# Patient Record
Sex: MALE | Race: BLACK OR AFRICAN AMERICAN | NOT HISPANIC OR LATINO | Employment: UNEMPLOYED | URBAN - METROPOLITAN AREA
[De-identification: names, ages, dates, MRNs, and addresses within clinical notes are randomized per-mention and may not be internally consistent; named-entity substitution may affect disease eponyms.]

---

## 2022-01-29 ENCOUNTER — HOSPITAL ENCOUNTER (EMERGENCY)
Age: 32
Discharge: LEFT BEFORE DISCHARGE | End: 2022-01-29

## 2022-01-29 VITALS
TEMPERATURE: 98.4 F | RESPIRATION RATE: 18 BRPM | OXYGEN SATURATION: 100 % | SYSTOLIC BLOOD PRESSURE: 129 MMHG | DIASTOLIC BLOOD PRESSURE: 86 MMHG | HEIGHT: 75 IN | BODY MASS INDEX: 22.38 KG/M2 | WEIGHT: 180 LBS | HEART RATE: 84 BPM

## 2022-01-29 LAB
ALBUMIN SERPL-MCNC: 4 G/DL (ref 3.6–5.1)
ALBUMIN/GLOB SERPL: 1 {RATIO} (ref 1–2.4)
ALP SERPL-CCNC: 88 UNITS/L (ref 45–117)
ALT SERPL-CCNC: 16 UNITS/L
ANION GAP SERPL CALC-SCNC: 12 MMOL/L (ref 10–20)
APPEARANCE UR: CLEAR
AST SERPL-CCNC: 9 UNITS/L
BACTERIA #/AREA URNS HPF: ABNORMAL /HPF
BASOPHILS # BLD: 0.1 K/MCL (ref 0–0.3)
BASOPHILS NFR BLD: 1 %
BILIRUB SERPL-MCNC: 0.3 MG/DL (ref 0.2–1)
BILIRUB UR QL STRIP: NEGATIVE
BUN SERPL-MCNC: 8 MG/DL (ref 6–20)
BUN/CREAT SERPL: 9 (ref 7–25)
CALCIUM SERPL-MCNC: 8.7 MG/DL (ref 8.4–10.2)
CHLORIDE SERPL-SCNC: 102 MMOL/L (ref 98–107)
CO2 SERPL-SCNC: 31 MMOL/L (ref 21–32)
COLOR UR: YELLOW
CREAT SERPL-MCNC: 0.94 MG/DL (ref 0.67–1.17)
DEPRECATED RDW RBC: 39.4 FL (ref 39–50)
EOSINOPHIL # BLD: 0.2 K/MCL (ref 0–0.5)
EOSINOPHIL NFR BLD: 3 %
ERYTHROCYTE [DISTWIDTH] IN BLOOD: 11.5 % (ref 11–15)
FASTING DURATION TIME PATIENT: NORMAL H
GFR SERPLBLD BASED ON 1.73 SQ M-ARVRAT: >90 ML/MIN
GLOBULIN SER-MCNC: 3.9 G/DL (ref 2–4)
GLUCOSE SERPL-MCNC: 89 MG/DL (ref 70–99)
GLUCOSE UR STRIP-MCNC: NEGATIVE MG/DL
HCT VFR BLD CALC: 40.4 % (ref 39–51)
HGB BLD-MCNC: 13.2 G/DL (ref 13–17)
HGB UR QL STRIP: ABNORMAL
HYALINE CASTS #/AREA URNS LPF: ABNORMAL /LPF
IMM GRANULOCYTES # BLD AUTO: 0 K/MCL (ref 0–0.2)
IMM GRANULOCYTES # BLD: 0 %
KETONES UR STRIP-MCNC: NEGATIVE MG/DL
LEUKOCYTE ESTERASE UR QL STRIP: ABNORMAL
LIPASE SERPL-CCNC: 92 UNITS/L (ref 73–393)
LYMPHOCYTES # BLD: 2.5 K/MCL (ref 1–4.8)
LYMPHOCYTES NFR BLD: 31 %
MCH RBC QN AUTO: 30.3 PG (ref 26–34)
MCHC RBC AUTO-ENTMCNC: 32.7 G/DL (ref 32–36.5)
MCV RBC AUTO: 92.7 FL (ref 78–100)
MONOCYTES # BLD: 0.7 K/MCL (ref 0.3–0.9)
MONOCYTES NFR BLD: 8 %
NEUTROPHILS # BLD: 4.6 K/MCL (ref 1.8–7.7)
NEUTROPHILS NFR BLD: 57 %
NITRITE UR QL STRIP: NEGATIVE
NRBC BLD MANUAL-RTO: 0 /100 WBC
PH UR STRIP: 6.5 [PH] (ref 5–7)
PLATELET # BLD AUTO: 218 K/MCL (ref 140–450)
POTASSIUM SERPL-SCNC: 3.7 MMOL/L (ref 3.4–5.1)
PROT SERPL-MCNC: 7.9 G/DL (ref 6.4–8.2)
PROT UR STRIP-MCNC: 100 MG/DL
RBC # BLD: 4.36 MIL/MCL (ref 4.5–5.9)
RBC #/AREA URNS HPF: ABNORMAL /HPF
SODIUM SERPL-SCNC: 141 MMOL/L (ref 135–145)
SP GR UR STRIP: >=1.03 (ref 1–1.03)
SQUAMOUS #/AREA URNS HPF: ABNORMAL /HPF
UROBILINOGEN UR STRIP-MCNC: 4 MG/DL
WBC # BLD: 8 K/MCL (ref 4.2–11)
WBC #/AREA URNS HPF: >100 /HPF

## 2022-01-29 PROCEDURE — 80053 COMPREHEN METABOLIC PANEL: CPT | Performed by: EMERGENCY MEDICINE

## 2022-01-29 PROCEDURE — 87086 URINE CULTURE/COLONY COUNT: CPT | Performed by: EMERGENCY MEDICINE

## 2022-01-29 PROCEDURE — 85025 COMPLETE CBC W/AUTO DIFF WBC: CPT | Performed by: EMERGENCY MEDICINE

## 2022-01-29 PROCEDURE — 83690 ASSAY OF LIPASE: CPT | Performed by: EMERGENCY MEDICINE

## 2022-01-29 PROCEDURE — 81001 URINALYSIS AUTO W/SCOPE: CPT | Performed by: EMERGENCY MEDICINE

## 2022-01-29 PROCEDURE — 10003627 HB COUNTER ED NO SERVICE

## 2022-01-29 PROCEDURE — 36415 COLL VENOUS BLD VENIPUNCTURE: CPT

## 2022-01-30 ENCOUNTER — APPOINTMENT (OUTPATIENT)
Dept: GENERAL RADIOLOGY | Facility: HOSPITAL | Age: 32
End: 2022-01-30
Attending: EMERGENCY MEDICINE
Payer: MEDICAID

## 2022-01-30 ENCOUNTER — HOSPITAL ENCOUNTER (EMERGENCY)
Facility: HOSPITAL | Age: 32
Discharge: HOME | End: 2022-01-30
Attending: EMERGENCY MEDICINE
Payer: MEDICAID

## 2022-01-30 VITALS
OXYGEN SATURATION: 99 % | RESPIRATION RATE: 20 BRPM | BODY MASS INDEX: 19.89 KG/M2 | HEART RATE: 63 BPM | TEMPERATURE: 99 F | DIASTOLIC BLOOD PRESSURE: 71 MMHG | WEIGHT: 160 LBS | SYSTOLIC BLOOD PRESSURE: 118 MMHG | HEIGHT: 75 IN

## 2022-01-30 DIAGNOSIS — K59.00 CONSTIPATION, UNSPECIFIED CONSTIPATION TYPE: Primary | ICD-10-CM

## 2022-01-30 DIAGNOSIS — K08.89 PAIN, DENTAL: ICD-10-CM

## 2022-01-30 LAB — BACTERIA UR CULT: NORMAL

## 2022-01-30 PROCEDURE — 99283 EMERGENCY DEPT VISIT LOW MDM: CPT

## 2022-01-30 PROCEDURE — 74019 RADEX ABDOMEN 2 VIEWS: CPT | Performed by: EMERGENCY MEDICINE

## 2022-01-30 RX ORDER — AMOXICILLIN AND CLAVULANATE POTASSIUM 875; 125 MG/1; MG/1
1 TABLET, FILM COATED ORAL 2 TIMES DAILY
Qty: 10 TABLET | Refills: 0 | Status: SHIPPED | OUTPATIENT
Start: 2022-01-30 | End: 2022-02-04

## 2022-01-31 NOTE — ED INITIAL ASSESSMENT (HPI)
Pt states last BM x 2 weeks ago. abd pain. States feels like has force urination. Denies nausea, vomiting, fever.

## 2025-03-07 ENCOUNTER — APPOINTMENT (OUTPATIENT)
Dept: GENERAL RADIOLOGY | Facility: HOSPITAL | Age: 35
End: 2025-03-07
Payer: MEDICAID

## 2025-03-07 ENCOUNTER — HOSPITAL ENCOUNTER (EMERGENCY)
Facility: HOSPITAL | Age: 35
Discharge: HOME | End: 2025-03-07
Attending: EMERGENCY MEDICINE
Payer: MEDICAID

## 2025-03-07 ENCOUNTER — TELEPHONE (OUTPATIENT)
Dept: ORTHOPEDICS CLINIC | Facility: CLINIC | Age: 35
End: 2025-03-07

## 2025-03-07 VITALS
SYSTOLIC BLOOD PRESSURE: 159 MMHG | DIASTOLIC BLOOD PRESSURE: 71 MMHG | HEART RATE: 64 BPM | BODY MASS INDEX: 18.27 KG/M2 | HEIGHT: 76 IN | TEMPERATURE: 97 F | WEIGHT: 150 LBS | RESPIRATION RATE: 20 BRPM | OXYGEN SATURATION: 100 %

## 2025-03-07 DIAGNOSIS — S69.92XA LEFT WRIST INJURY, INITIAL ENCOUNTER: Primary | ICD-10-CM

## 2025-03-07 DIAGNOSIS — M25.532 LEFT WRIST PAIN: ICD-10-CM

## 2025-03-07 DIAGNOSIS — M25.522 LEFT ELBOW PAIN: Primary | ICD-10-CM

## 2025-03-07 PROCEDURE — 73110 X-RAY EXAM OF WRIST: CPT | Performed by: EMERGENCY MEDICINE

## 2025-03-07 PROCEDURE — 73080 X-RAY EXAM OF ELBOW: CPT | Performed by: EMERGENCY MEDICINE

## 2025-03-07 PROCEDURE — 99284 EMERGENCY DEPT VISIT MOD MDM: CPT

## 2025-03-07 PROCEDURE — 73030 X-RAY EXAM OF SHOULDER: CPT | Performed by: EMERGENCY MEDICINE

## 2025-03-07 PROCEDURE — 29125 APPL SHORT ARM SPLINT STATIC: CPT

## 2025-03-07 RX ORDER — HYDROCODONE BITARTRATE AND ACETAMINOPHEN 5; 325 MG/1; MG/1
1 TABLET ORAL ONCE
Status: COMPLETED | OUTPATIENT
Start: 2025-03-07 | End: 2025-03-07

## 2025-03-07 RX ORDER — IBUPROFEN 600 MG/1
600 TABLET, FILM COATED ORAL ONCE
Status: COMPLETED | OUTPATIENT
Start: 2025-03-07 | End: 2025-03-07

## 2025-03-07 RX ORDER — HYDROCODONE BITARTRATE AND ACETAMINOPHEN 5; 325 MG/1; MG/1
1 TABLET ORAL EVERY 6 HOURS PRN
Qty: 10 TABLET | Refills: 0 | Status: SHIPPED | OUTPATIENT
Start: 2025-03-07 | End: 2025-03-12

## 2025-03-07 NOTE — TELEPHONE ENCOUNTER
Can you see this patient?  If so, when?  Please advise.  Thank you1      DOI    3/7/25      XR  3/7/25    Impression   CONCLUSION:     Dorsal subluxation of the wrist at the level of the distal radioulnar joint is nonspecific but can seen with distal radioulnar joint instability.

## 2025-03-07 NOTE — ED INITIAL ASSESSMENT (HPI)
Pt reports he fell yesterday and landed on his left side, Pt reports left shoulder, left elbow, and left wrist pain. Pt reports no loc and reports he did not hit his head. Cms present to his left hand.

## 2025-03-07 NOTE — TELEPHONE ENCOUNTER
Patient was in Emergency room today. In need of an appointment for Left wrist injury/dislocation. Xrays in Epic.  Please contact mom at [phone]

## 2025-03-07 NOTE — ED PROVIDER NOTES
Patient Seen in: [facility] Emergency Department    History     Chief Complaint   Patient presents with    Fall    Arm or Hand Injury       HPI    34-year-old male who had a mechanical fall yesterday falling onto his left wrist, did not strike his head.  He does report left wrist pain but also mild amount of left shoulder pain.  No weakness or numbness or paresthesias.    History reviewed.   Past Medical History:    Seizure disorder (HCC)       History reviewed. History reviewed. No pertinent surgical history.      Medications :  Prescriptions Prior to Admission[1]     No family history on file.    Smoking Status:   Social History     Socioeconomic History    Marital status: Single   Tobacco Use    Smoking status: Never    Smokeless tobacco: Never   Vaping Use    Vaping status: Never Used   Substance and Sexual Activity    Alcohol use: Never    Drug use: Never       Constitutional and vital signs reviewed.      Social History and Family History elements reviewed from today, pertinent positives to the presenting problem noted.    Physical Exam     ED Triage Vitals [03/07/25 1018]   /71   Pulse 64   Resp 20   Temp 97.1 °F (36.2 °C)   Temp src Temporal   SpO2 100 %   O2 Device None (Room air)       All measures to prevent infection transmission during my interaction with the patient were taken. The patient was already wearing a droplet mask on my arrival to the room. Personal protective equipment was worn throughout the duration of the exam.  Handwashing was performed prior to and after the exam.  Stethoscope and any equipment used during my examination was cleaned with super sani-cloth germicidal wipes following the exam.     Physical Exam    General: NAD  Head: Normocephalic and atraumatic.  Mouth/Throat/Ears/Nose: No hoarseness of voice  Eyes: Conjunctivae and EOM are normal.  Neck: Normal range of motion. Supple.   Cardiovascular: Normal rate  Respiratory/Chest: No tachypnea.  Gastrointestinal:  Nondistended  Musculoskeletal:No  deformity.  Trace swelling at the left dorsal radial tenderness, 2+ radial pulse . SILT.  A-OK and thumbs up signs.  There is mild tenderness and ecchymosis at the left shoulder.  5 out of 5 left hand  strength, shoulder abduction strength.  Neurological: Alert and appropriate.   Skin: Skin is warm and dry. No pallor.  Psychiatric: Has a normal mood and affect.      ED Course      Labs Reviewed - No data to display    As Interpreted by me    Imaging Results Available and Reviewed while in ED: XR WRIST COMPLETE (MIN 3 VIEWS), LEFT (CPT=73110)    Result Date: 3/7/2025  CONCLUSION:   Dorsal subluxation of the wrist at the level of the distal radioulnar joint is nonspecific but can seen with distal radioulnar joint instability.  No acute fracture.     Dictated by (CST): Jovani Del Angel MD on 3/07/2025 at 11:31 AM     Finalized by (CST): Jovani Del Angel MD on 3/07/2025 at 11:32 AM          XR ELBOW, COMPLETE (MIN 3 VIEWS), LEFT (CPT=73080)    Result Date: 3/7/2025  CONCLUSION:   Chronic appearing radial head dislocation with a dysplastic appearance of the radial head and neck.  No acute fracture.  Correlation with prior imaging recommended to demonstrate stability     Dictated by (CST): Jovain Del Angel MD on 3/07/2025 at 11:29 AM     Finalized by (CST): Jovani Del Angel MD on 3/07/2025 at 11:31 AM          XR SHOULDER, COMPLETE (MIN 2 VIEWS), LEFT (CPT=73030)    Result Date: 3/7/2025  CONCLUSION: Normal examination.     Dictated by (CST): Jovani Del Angel MD on 3/07/2025 at 11:28 AM     Finalized by (CST): Jovani Del Angel MD on 3/07/2025 at 11:29 AM         ED Medications Administered:   Medications   HYDROcodone-acetaminophen (Norco) 5-325 MG per tab 1 tablet (1 tablet Oral Given 3/7/25 1154)   ibuprofen (Motrin) tab 600 mg (600 mg Oral Given 3/7/25 1154)         MDM     Vitals:    03/07/25 1018   BP: 159/71   Pulse: 64   Resp: 20   Temp: 97.1 °F (36.2 °C)   TempSrc: Temporal   SpO2: 100%   Weight: 68  kg   Height: 193 cm (6' 4\")     *I personally reviewed and interpreted all ED vitals.    Pulse Ox: 100%, Room air, Normal     Medical Decision Making      Differential Diagnosis/ Diagnostic Considerations: Scaphoid fracture, contusion    Complicating Factors: The patient already has seizure disorder to contribute to the complexity of this ED evaluation.    I reviewed prior chart records including this note from February 27, 2025.  X-ray of the wrist without obvious fracture on my interpretation, agree with subtle possible subluxation noted on report, splint applied, understands to follow-up closely with orthopedics.  Referral provided.    Dc In stable condition.  Patient, mother and aunt are comfortable with the plan.    Prescriptions: As below    Procedure: Splint application  Indication: possible radio ulnar joint instability   Type: sugar tong   Performed by: ED tech   Complications: None   Post-splint exam by myself: Neurovascularly intact         Disposition and Plan     Clinical Impression:  1. Left wrist injury, initial encounter        Disposition:  Discharge    Follow-up:  Maikel Maurice MD  [address] [address]  [phone]    Schedule an appointment as soon as possible for a visit in 2 day(s)        Medications Prescribed:  Discharge Medication List as of 3/7/2025 12:09 PM        START taking these medications    Details   HYDROcodone-acetaminophen 5-325 MG Oral Tab Take 1 tablet by mouth every 6 (six) hours as needed., Normal, Disp-10 tablet, R-0                              [1] (Not in a hospital admission)

## 2025-03-13 ENCOUNTER — HOSPITAL ENCOUNTER (OUTPATIENT)
Dept: GENERAL RADIOLOGY | Age: 35
Discharge: HOME | End: 2025-03-13
Attending: ORTHOPAEDIC SURGERY
Payer: MEDICAID

## 2025-03-13 ENCOUNTER — OFFICE VISIT (OUTPATIENT)
Facility: CLINIC | Age: 35
End: 2025-03-13
Payer: MEDICAID

## 2025-03-13 DIAGNOSIS — M25.532 LEFT WRIST PAIN: ICD-10-CM

## 2025-03-13 DIAGNOSIS — M25.522 LEFT ELBOW PAIN: ICD-10-CM

## 2025-03-13 DIAGNOSIS — M25.522 LEFT ELBOW PAIN: Primary | ICD-10-CM

## 2025-03-13 DIAGNOSIS — S53.005S RADIAL HEAD DISLOCATION, LEFT, SEQUELA: ICD-10-CM

## 2025-03-13 PROCEDURE — 99204 OFFICE O/P NEW MOD 45 MIN: CPT | Performed by: STUDENT IN AN ORGANIZED HEALTH CARE EDUCATION/TRAINING PROGRAM

## 2025-03-13 PROCEDURE — 73080 X-RAY EXAM OF ELBOW: CPT | Performed by: ORTHOPAEDIC SURGERY

## 2025-03-13 PROCEDURE — 73110 X-RAY EXAM OF WRIST: CPT | Performed by: ORTHOPAEDIC SURGERY

## 2025-03-13 RX ORDER — HYDROCODONE BITARTRATE AND ACETAMINOPHEN 5; 325 MG/1; MG/1
1 TABLET ORAL
COMMUNITY
Start: 2025-03-07

## 2025-03-14 NOTE — PROGRESS NOTES
Clinic Note     Allergies[1]    CC: Left wrist pain    HPI: This 34 year old RHD male presents with left wrist pain after a fall on 3/7/25. The patient does have a history of intermittent left wrist and left elbow pain, from an injury he sustained as a young child. He has a known chronic left radial head dislocation. He reports he was walking down the stairs and fell onto his left wrist on 3/7. He felt pain in the wrist at that time. It has improved some over the past few days.  presents with his mother today, who provided much of the history during the visit.     Review of Systems:  Negative unless stated in HPI.    History/Other:   Past Medical History:    Seizure disorder (HCC)     No past surgical history on file.  Current Outpatient Medications   Medication Sig Dispense Refill    HYDROcodone-acetaminophen 5-325 MG Oral Tab Take 1 tablet by mouth.       No family history on file.  Social History     Occupational History    Not on file   Tobacco Use    Smoking status: Never    Smokeless tobacco: Never   Vaping Use    Vaping status: Never Used   Substance and Sexual Activity    Alcohol use: Never    Drug use: Never    Sexual activity: Not on file        Physical Exam:         Constitutional: NAD. AOx3. Well-developed and Well-nourished.   Psychiatric: Normal mood/ affect/ behavior. Judgment and thought content normal.     Left Upper Extremity:     Inspection    Skin intact. No skin lesions. No obvious mass visualized. No crepitus to ROM. Mild swelling over the left wrist. Visible deformity to left elbow, this is per baseline per patient and his mother.   Palpation    Mild tenderness to palpation throughout the left elbow as well as the left wrist, non-specific.      ROM    Able to flex and extend the elbow and pronosupinate the forearm with ease; at baseline. Able to gently flex, extend the wrist with ease. Mild pain at terminal endpoints of motion.    No gross DRUJ instability is appreciated.       Neurovascular    Normal sensation in the median, ulnar, and radial nerve distribution. Normal motor function of muscles innervated by median/AIN, ulnar, and radial/PIN nerves.    Normally perfused hand(s).                     Diagnostic Studies:  I reviewed the images independently from the professional interpretation, and discussed my interpretation of the pertinent findings with patient/family.    Xrays of Left elbow 3V: On my independent review of the radiographs, there is evidence of dysplastic appearance of the radial head, consistent with chronic radial head dislocation.    Xrays of left wrist 3V: There is evidence of ulnar positivity as well as dorsal subluxation of the ulna relative to the radius.     Assessment/Plan:  34 year old male with Left wrist and elbow pain after recent fall, in setting of known chronic left radial head dislocation and left upper extremity pain from an injury as a child.    I reviewed the patient's electronic medical record, including the pertinent office notes, medical/surgical history, medications and images. I specifically reviewed the images noted above, independently and discussed my independent interpretation of these images in combination with the pertinent positive and negative findings in my clinical exam with the patient    I discussed the options at length with  and his mother today. It does appear that  has a known chronic radial head dislocation with associated dysplastic appearance of the radial head, and it is quite possible that this is contributing to the incongruity of the distal radioulnar joint seen on xrays. I do not believe this incongruity at the wrist joint is acute, given the chronic elbow findings. After discussion, we will proceed to treat 's mild wrist pain as a wrist sprain, and a removable wrist brace was provided. We briefly discussed reconstructive options for the left chronic radial head dislocation and left distal  radioulnar joint, however this was deferred, as 's mother and I are hopeful that  can return to his pre-fall baseline with little to no pain after a trial of wrist bracing, All questions answered.    Follow Up: 4-6 weeks for repeat eval    Craig Farrell MD   Hand, Wrist, & Elbow Surgery  [email or web address]       [1] No Known Allergies

## (undated) NOTE — LETTER
Date & Time: 3/7/2025, 12:17 PM  Patient: Marquise Moore  Encounter Provider(s):    Jeffrey Serrato MD       To Whom It May Concern:    Marquise Moore was seen and treated in our department on 3/7/2025. 's mother should not return to work until 03/10/2025 .    If you have any questions or concerns, please do not hesitate to call.        _____________________________  Physician/APC Signature